# Patient Record
Sex: MALE | Race: WHITE | NOT HISPANIC OR LATINO | URBAN - METROPOLITAN AREA
[De-identification: names, ages, dates, MRNs, and addresses within clinical notes are randomized per-mention and may not be internally consistent; named-entity substitution may affect disease eponyms.]

---

## 2020-01-15 ENCOUNTER — OFFICE (OUTPATIENT)
Dept: URBAN - METROPOLITAN AREA CLINIC 8 | Facility: CLINIC | Age: 80
End: 2020-01-15

## 2020-01-15 VITALS
WEIGHT: 193 LBS | SYSTOLIC BLOOD PRESSURE: 150 MMHG | HEIGHT: 70 IN | HEART RATE: 81 BPM | DIASTOLIC BLOOD PRESSURE: 73 MMHG

## 2020-01-15 DIAGNOSIS — R85.89 OTHER ABNORMAL FINDINGS IN SPECIMENS FROM DIGESTIVE ORGANS A: ICD-10-CM

## 2020-01-15 PROCEDURE — 99202 OFFICE O/P NEW SF 15 MIN: CPT | Performed by: INTERNAL MEDICINE

## 2020-01-29 ENCOUNTER — AMBULATORY SURGICAL CENTER (OUTPATIENT)
Dept: URBAN - METROPOLITAN AREA SURGERY 2 | Facility: SURGERY | Age: 80
End: 2020-01-29

## 2020-01-29 ENCOUNTER — OFFICE (OUTPATIENT)
Dept: URBAN - METROPOLITAN AREA PATHOLOGY 22 | Facility: PATHOLOGY | Age: 80
End: 2020-01-29

## 2020-01-29 ENCOUNTER — AMBULATORY SURGICAL CENTER (OUTPATIENT)
Dept: URBAN - METROPOLITAN AREA SURGERY 2 | Facility: SURGERY | Age: 80
End: 2020-01-29
Payer: MEDICARE

## 2020-01-29 VITALS
HEART RATE: 62 BPM | RESPIRATION RATE: 19 BRPM | RESPIRATION RATE: 19 BRPM | TEMPERATURE: 97.5 F | HEIGHT: 70 IN | TEMPERATURE: 97.6 F | SYSTOLIC BLOOD PRESSURE: 112 MMHG | HEART RATE: 62 BPM | HEART RATE: 68 BPM | TEMPERATURE: 97.5 F | DIASTOLIC BLOOD PRESSURE: 53 MMHG | RESPIRATION RATE: 19 BRPM | DIASTOLIC BLOOD PRESSURE: 53 MMHG | RESPIRATION RATE: 18 BRPM | SYSTOLIC BLOOD PRESSURE: 112 MMHG | DIASTOLIC BLOOD PRESSURE: 54 MMHG | SYSTOLIC BLOOD PRESSURE: 109 MMHG | DIASTOLIC BLOOD PRESSURE: 56 MMHG | OXYGEN SATURATION: 98 % | HEART RATE: 68 BPM | WEIGHT: 190 LBS | HEART RATE: 62 BPM | DIASTOLIC BLOOD PRESSURE: 53 MMHG | TEMPERATURE: 97.5 F | HEART RATE: 61 BPM | DIASTOLIC BLOOD PRESSURE: 58 MMHG | RESPIRATION RATE: 18 BRPM | SYSTOLIC BLOOD PRESSURE: 111 MMHG | DIASTOLIC BLOOD PRESSURE: 58 MMHG | RESPIRATION RATE: 18 BRPM | OXYGEN SATURATION: 98 % | SYSTOLIC BLOOD PRESSURE: 109 MMHG | TEMPERATURE: 97.6 F | HEART RATE: 61 BPM | DIASTOLIC BLOOD PRESSURE: 56 MMHG | OXYGEN SATURATION: 98 % | WEIGHT: 190 LBS | OXYGEN SATURATION: 97 % | SYSTOLIC BLOOD PRESSURE: 111 MMHG | DIASTOLIC BLOOD PRESSURE: 56 MMHG | TEMPERATURE: 97.6 F | HEART RATE: 68 BPM | DIASTOLIC BLOOD PRESSURE: 58 MMHG | DIASTOLIC BLOOD PRESSURE: 54 MMHG | WEIGHT: 190 LBS | DIASTOLIC BLOOD PRESSURE: 54 MMHG | SYSTOLIC BLOOD PRESSURE: 109 MMHG | OXYGEN SATURATION: 97 % | SYSTOLIC BLOOD PRESSURE: 111 MMHG | SYSTOLIC BLOOD PRESSURE: 112 MMHG | HEART RATE: 61 BPM | OXYGEN SATURATION: 97 % | HEIGHT: 70 IN | HEIGHT: 70 IN

## 2020-01-29 DIAGNOSIS — R19.5 OTHER FECAL ABNORMALITIES: ICD-10-CM

## 2020-01-29 DIAGNOSIS — D12.8 BENIGN NEOPLASM OF RECTUM: ICD-10-CM

## 2020-01-29 DIAGNOSIS — D12.3 BENIGN NEOPLASM OF TRANSVERSE COLON: ICD-10-CM

## 2020-01-29 DIAGNOSIS — D12.2 BENIGN NEOPLASM OF ASCENDING COLON: ICD-10-CM

## 2020-01-29 DIAGNOSIS — K64.3 FOURTH DEGREE HEMORRHOIDS: ICD-10-CM

## 2020-01-29 PROBLEM — K62.1 RECTAL POLYP: Status: ACTIVE | Noted: 2020-01-29

## 2020-01-29 PROCEDURE — 45380 COLONOSCOPY AND BIOPSY: CPT | Mod: 59 | Performed by: INTERNAL MEDICINE

## 2020-01-29 PROCEDURE — G8918 PT W/O PREOP ORDER IV AB PRO: HCPCS | Performed by: INTERNAL MEDICINE

## 2020-01-29 PROCEDURE — 45385 COLONOSCOPY W/LESION REMOVAL: CPT | Performed by: INTERNAL MEDICINE

## 2020-01-29 PROCEDURE — G8907 PT DOC NO EVENTS ON DISCHARG: HCPCS | Performed by: INTERNAL MEDICINE

## 2020-01-29 PROCEDURE — 88305 TISSUE EXAM BY PATHOLOGIST: CPT | Performed by: INTERNAL MEDICINE

## 2021-09-30 ENCOUNTER — OFFICE (OUTPATIENT)
Dept: URBAN - METROPOLITAN AREA CLINIC 8 | Facility: CLINIC | Age: 81
End: 2021-09-30

## 2021-09-30 VITALS
HEIGHT: 70 IN | WEIGHT: 189 LBS | OXYGEN SATURATION: 98 % | HEART RATE: 78 BPM | DIASTOLIC BLOOD PRESSURE: 69 MMHG | SYSTOLIC BLOOD PRESSURE: 139 MMHG

## 2021-09-30 DIAGNOSIS — Z86.010 PERSONAL HISTORY OF COLONIC POLYPS: ICD-10-CM

## 2021-09-30 DIAGNOSIS — Z95.0 PRESENCE OF CARDIAC PACEMAKER: ICD-10-CM

## 2021-09-30 DIAGNOSIS — R10.30 LOWER ABDOMINAL PAIN, UNSPECIFIED: ICD-10-CM

## 2021-09-30 PROCEDURE — 99213 OFFICE O/P EST LOW 20 MIN: CPT | Performed by: INTERNAL MEDICINE

## 2023-12-21 ENCOUNTER — OFFICE (OUTPATIENT)
Dept: URBAN - METROPOLITAN AREA CLINIC 8 | Facility: CLINIC | Age: 83
End: 2023-12-21

## 2023-12-21 VITALS
SYSTOLIC BLOOD PRESSURE: 132 MMHG | WEIGHT: 183 LBS | DIASTOLIC BLOOD PRESSURE: 72 MMHG | HEIGHT: 70 IN | HEART RATE: 81 BPM | OXYGEN SATURATION: 93 %

## 2023-12-21 DIAGNOSIS — Z86.010 PERSONAL HISTORY OF COLONIC POLYPS: ICD-10-CM

## 2023-12-21 DIAGNOSIS — K64.8 OTHER HEMORRHOIDS: ICD-10-CM

## 2023-12-21 DIAGNOSIS — K92.1 MELENA: ICD-10-CM

## 2023-12-21 DIAGNOSIS — Z79.1 LONG TERM (CURRENT) USE OF NON-STEROIDAL ANTI-INFLAMMATORIES: ICD-10-CM

## 2023-12-21 PROCEDURE — 99214 OFFICE O/P EST MOD 30 MIN: CPT | Performed by: STUDENT IN AN ORGANIZED HEALTH CARE EDUCATION/TRAINING PROGRAM

## 2023-12-21 RX ORDER — HYDROCORTISONE ACETATE 25 MG/1
SUPPOSITORY RECTAL
Qty: 14 | Refills: 2 | Status: COMPLETED
Start: 2023-12-21 | End: 2024-03-21

## 2023-12-21 NOTE — SERVICENOTES
the patient has persistent bleeding and symptomatic hemorrhoids for many years that is worsening over the last 4 weeks.  I encouraged him to continue Metamucil supplementation and can add MiraLax to help soften his stools and reduce the amount of straining.  Will give him a trial of Anusol suppositories for a few weeks and see how things go.  I discussed with him colon cancer  screening discontinuation since he is 83 years old and has history of cardiac issues.  A joint decision was made to discontinue further colonoscopies.  All questions addressed.   If he is not responding to the medical therapy for his hemorrhoids then he will likely need referral to surgery at next visit.  He would not be a candidate for in-office banding due to the size of his hemorrhoids.

## 2023-12-21 NOTE — SERVICEHPINOTES
Mr. Edin Millard is an 83-year-old male with past medical history of cardiac pacemaker,  symptomatic hemorrhoids, who presents to [place] [address] as a follow-up for rectal bleeding and burning.
br
br clinic visit 12/21/2023: 
br patient was previously seen by my partner Dr. Zafar.  patient reports that he has had symptomatic hemorrhoids for many years.  He uses Metamucil daily.  He reports bleeding from his hemorrhoids pretty frequently just about every other day.  He has about 1 bowel movement per day, sometimes has to strain.  He is not using any other stool softeners.  He is not using any other over-the-counter creams or medications for his hemorrhoids.  His last colonoscopy was in January 2020 which showed large external  and internal hemorrhoids, 2 small sessile serrated adenomas and 2 tubular adenomas,  repeat was recommended in 3 years.  The patient is now 83 years old and outside of the screening age.  He had a CT scan performed in 2021 for lower abdominal pain which incidentally showed some hepatic cysts that appeared benign but no other pathology was seen.   Patient is a very healthy 83-year-old and still remains very active.

## 2024-03-21 ENCOUNTER — OFFICE (OUTPATIENT)
Dept: URBAN - METROPOLITAN AREA CLINIC 9 | Facility: CLINIC | Age: 84
End: 2024-03-21
Payer: COMMERCIAL

## 2024-03-21 VITALS
HEART RATE: 85 BPM | WEIGHT: 182 LBS | HEIGHT: 70 IN | DIASTOLIC BLOOD PRESSURE: 104 MMHG | OXYGEN SATURATION: 99 % | SYSTOLIC BLOOD PRESSURE: 176 MMHG

## 2024-03-21 DIAGNOSIS — K64.9 UNSPECIFIED HEMORRHOIDS: ICD-10-CM

## 2024-03-21 DIAGNOSIS — K62.89 OTHER SPECIFIED DISEASES OF ANUS AND RECTUM: ICD-10-CM

## 2024-03-21 DIAGNOSIS — K92.1 MELENA: ICD-10-CM

## 2024-03-21 PROCEDURE — 99214 OFFICE O/P EST MOD 30 MIN: CPT | Performed by: STUDENT IN AN ORGANIZED HEALTH CARE EDUCATION/TRAINING PROGRAM

## 2024-03-21 NOTE — SERVICENOTES
Since patient does not desire surgical intervention I think we should try hemorrhoidal banding on him in a couple of weeks.  I discussed with him the risks and benefits.  He is going to use over-the-counter recta care as needed for pain relief.  He is also going to increase his MiraLax to twice a day to help him have more regular bowel movements.  He is going to continue using Metamucil.  High-fiber diet.  All questions addressed.  I personally reviewed his previous medical records for his visit today.

## 2024-03-21 NOTE — SERVICEHPINOTES
Mr. Edin Millard is an 83-year-old male with past medical history of cardiac pacemaker,  symptomatic hemorrhoids, who initially presented to gastro  as a follow-up for rectal bleeding and burning.clinic visit 12/21/2023: brpatient was previously seen by my partner Dr. Zafar.  patient reports that he has had symptomatic hemorrhoids for many years.  He uses Metamucil daily.  He reports bleeding from his hemorrhoids pretty frequently just about every other day.  He has about 1 bowel movement per day, sometimes has to strain.  He is not using any other stool softeners.  He is not using any other over-the-counter creams or medications for his hemorrhoids.  His last colonoscopy was in January 2020 which showed large external  and internal hemorrhoids, 2 small sessile serrated adenomas and 2 tubular adenomas,  repeat was recommended in 3 years.  The patient is now 83 years old and outside of the screening age.  He had a CT scan performed in 2021 for lower abdominal pain which incidentally showed some hepatic cysts that appeared benign but no other pathology was seen.   Patient is a very healthy 83-year-old and still remains very active. 
br
br     Clinic visit 03/21/2024:
br 
Patient reports that after using the Anusol suppositories for a couple of weeks he noticed no change in his bleeding or rectal symptoms.  He continues to have burning after bowel movements 3 or 4 times a week.  Sometimes the burning or pain is so severe that it is unbearable.  He has about 1 bowel movement per day.  Colonoscopy in 2020 showed some precancerous polyps and large internal and external hemorrhoids.  He tries to supplement his diet with fiber.  He is only on a baby aspirin daily.  His pacemaker was okay at the last check and he is scheduled to have another check this week.  He reports he just does not want to get "cut on".

## 2024-04-04 ENCOUNTER — OFFICE (OUTPATIENT)
Dept: URBAN - METROPOLITAN AREA CLINIC 9 | Facility: CLINIC | Age: 84
End: 2024-04-04
Payer: COMMERCIAL

## 2024-04-04 VITALS — HEIGHT: 70 IN

## 2024-04-04 DIAGNOSIS — K64.9 UNSPECIFIED HEMORRHOIDS: ICD-10-CM

## 2024-04-04 PROCEDURE — 46221 LIGATION OF HEMORRHOID(S): CPT | Performed by: STUDENT IN AN ORGANIZED HEALTH CARE EDUCATION/TRAINING PROGRAM

## 2024-04-04 NOTE — SERVICEHPINOTES
Mr. Edin Millard is an 83-year-old male with past medical history of cardiac pacemaker,  symptomatic hemorrhoids, who initially presented to [place] [address] as a follow-up for rectal bleeding and burning.clinic visit 12/21/2023: mariela was previously seen by my partner Dr. Zafar.  patient reports that he has had symptomatic hemorrhoids for many years.  He uses Metamucil daily.  He reports bleeding from his hemorrhoids pretty frequently just about every other day.  He has about 1 bowel movement per day, sometimes has to strain.  He is not using any other stool softeners.  He is not using any other over-the-counter creams or medications for his hemorrhoids.  His last colonoscopy was in January 2020 which showed large external  and internal hemorrhoids, 2 small sessile serrated adenomas and 2 tubular adenomas,  repeat was recommended in 3 years.  The patient is now 83 years old and outside of the screening age.  He had a CT scan performed in 2021 for lower abdominal pain which incidentally showed some hepatic cysts that appeared benign but no other pathology was seen.   Patient is a very healthy 83-year-old and still remains very active.  Clinic visit 03/21/2024:Mariela reports that after using the Anusol suppositories for a couple of weeks he noticed no change in his bleeding or rectal symptoms.  He continues to have burning after bowel movements 3 or 4 times a week.  Sometimes the burning or pain is so severe that it is unbearable.  He has about 1 bowel movement per day.  Colonoscopy in 2020 showed some precancerous polyps and large internal and external hemorrhoids.  He tries to supplement his diet with fiber.  He is only on a baby aspirin daily.  His pacemaker was okay at the last check and he is scheduled to have another check this week.  He reports he just does not want to get "cut on".
masood correia    Hemorrhoid banding 04/04/2024:  
masood correia

## 2024-04-04 NOTE — SERVICENOTES
Patient had significant discomfort and pain during his banding procedure despite multiple positioning is of the  in his rectal vault suggesting there may be a component of proctalgia fugax or anal fissure so I am going to give him some nitroglycerin lidocaine ointment to use twice a day to see if this helps until his next banding.

## 2024-04-18 ENCOUNTER — OFFICE (OUTPATIENT)
Dept: URBAN - METROPOLITAN AREA CLINIC 9 | Facility: CLINIC | Age: 84
End: 2024-04-18
Payer: COMMERCIAL

## 2024-04-18 VITALS — HEIGHT: 70 IN

## 2024-04-18 DIAGNOSIS — K64.9 UNSPECIFIED HEMORRHOIDS: ICD-10-CM

## 2024-04-18 PROCEDURE — 46221 LIGATION OF HEMORRHOID(S): CPT | Performed by: STUDENT IN AN ORGANIZED HEALTH CARE EDUCATION/TRAINING PROGRAM

## 2024-05-02 ENCOUNTER — OFFICE (OUTPATIENT)
Dept: URBAN - METROPOLITAN AREA CLINIC 9 | Facility: CLINIC | Age: 84
End: 2024-05-02
Payer: COMMERCIAL

## 2024-05-02 VITALS — HEIGHT: 70 IN

## 2024-05-02 DIAGNOSIS — K64.9 UNSPECIFIED HEMORRHOIDS: ICD-10-CM

## 2024-05-02 PROCEDURE — 46221 LIGATION OF HEMORRHOID(S): CPT | Performed by: STUDENT IN AN ORGANIZED HEALTH CARE EDUCATION/TRAINING PROGRAM
